# Patient Record
Sex: MALE | Race: WHITE | Employment: OTHER | URBAN - METROPOLITAN AREA
[De-identification: names, ages, dates, MRNs, and addresses within clinical notes are randomized per-mention and may not be internally consistent; named-entity substitution may affect disease eponyms.]

---

## 2018-07-18 PROBLEM — H25.13 NUCLEAR SENILE CATARACT OF BOTH EYES: Status: ACTIVE | Noted: 2018-07-18

## 2019-11-30 ENCOUNTER — HOSPITAL ENCOUNTER (OUTPATIENT)
Age: 69
Discharge: HOME | End: 2019-11-30
Attending: EMERGENCY MEDICINE
Payer: COMMERCIAL

## 2019-11-30 VITALS
HEART RATE: 67 BPM | HEIGHT: 68 IN | RESPIRATION RATE: 18 BRPM | BODY MASS INDEX: 25.46 KG/M2 | WEIGHT: 168 LBS | DIASTOLIC BLOOD PRESSURE: 50 MMHG | OXYGEN SATURATION: 99 % | TEMPERATURE: 98 F | SYSTOLIC BLOOD PRESSURE: 176 MMHG

## 2019-11-30 DIAGNOSIS — K61.1 PERIRECTAL ABSCESS: Primary | ICD-10-CM

## 2019-11-30 PROCEDURE — 99204 OFFICE O/P NEW MOD 45 MIN: CPT

## 2019-11-30 PROCEDURE — 99203 OFFICE O/P NEW LOW 30 MIN: CPT

## 2019-11-30 RX ORDER — CLINDAMYCIN HYDROCHLORIDE 300 MG/1
300 CAPSULE ORAL 3 TIMES DAILY
Qty: 30 CAPSULE | Refills: 0 | Status: SHIPPED | OUTPATIENT
Start: 2019-11-30 | End: 2019-12-10

## 2019-11-30 NOTE — ED PROVIDER NOTES
Patient Seen in: [facility] Immediate Care In [address] [place] [address]      History   Patient presents with:  Rash Skin Problem (integumentary)    Stated Complaint: abcess    HPI    79-year-old male presents for evaluation of pimple.   Patient reports over the pas respiratory distress. Genitourinary:     Comments: Nontender external hemorrhoids noted, single nontender pustule noted at 3 o'clock position approximately 1 cm from anus, no rectal tenderness  Skin:     General: Skin is warm.       Capillary Refill: Capi

## 2019-11-30 NOTE — ED NOTES
Rectal area pimple no surrounding redness or pain with sitting. No fevers. States he felt this pimple  few days ago. There is no drainage or signs of infection seen.

## 2020-03-28 ENCOUNTER — HOSPITAL ENCOUNTER (OUTPATIENT)
Age: 70
Discharge: HOME | End: 2020-03-28
Attending: EMERGENCY MEDICINE
Payer: COMMERCIAL

## 2020-03-28 ENCOUNTER — APPOINTMENT (OUTPATIENT)
Dept: GENERAL RADIOLOGY | Age: 70
End: 2020-03-28
Attending: EMERGENCY MEDICINE
Payer: COMMERCIAL

## 2020-03-28 VITALS
SYSTOLIC BLOOD PRESSURE: 149 MMHG | DIASTOLIC BLOOD PRESSURE: 74 MMHG | OXYGEN SATURATION: 96 % | RESPIRATION RATE: 18 BRPM | TEMPERATURE: 98 F | HEART RATE: 79 BPM

## 2020-03-28 DIAGNOSIS — S63.501A SPRAIN OF RIGHT WRIST, INITIAL ENCOUNTER: Primary | ICD-10-CM

## 2020-03-28 PROCEDURE — 99213 OFFICE O/P EST LOW 20 MIN: CPT

## 2020-03-28 PROCEDURE — 73130 X-RAY EXAM OF HAND: CPT | Performed by: EMERGENCY MEDICINE

## 2020-03-28 PROCEDURE — 73110 X-RAY EXAM OF WRIST: CPT | Performed by: EMERGENCY MEDICINE

## 2020-03-28 NOTE — ED PROVIDER NOTES
Patient Seen in: [facility] Immediate Care In [address] [place] [address]      History   Patient presents with:  Hand Pain    Stated Complaint: needs x-ray of right hand    HPI    The patient is a 71-year-old male with past history of hypertension, \"borderline\" di well-nourished in no acute distress  Head: Normocephalic, no swelling or tenderness  Eyes: Nonicteric sclera, no conjunctival injection  Vascular: Easily palpable right radial pulse with good capillary refill and all digits  Neurologic: Patient is awake, a

## 2020-04-15 ENCOUNTER — HOSPITAL ENCOUNTER (OUTPATIENT)
Age: 70
Discharge: HOME | End: 2020-04-15
Payer: COMMERCIAL

## 2020-04-15 VITALS
WEIGHT: 170 LBS | DIASTOLIC BLOOD PRESSURE: 70 MMHG | OXYGEN SATURATION: 100 % | RESPIRATION RATE: 19 BRPM | BODY MASS INDEX: 24.34 KG/M2 | HEART RATE: 72 BPM | TEMPERATURE: 98 F | HEIGHT: 70 IN | SYSTOLIC BLOOD PRESSURE: 159 MMHG

## 2020-04-15 DIAGNOSIS — N48.1 BALANITIS: Primary | ICD-10-CM

## 2020-04-15 PROCEDURE — 99214 OFFICE O/P EST MOD 30 MIN: CPT

## 2020-04-15 PROCEDURE — 99213 OFFICE O/P EST LOW 20 MIN: CPT

## 2020-04-15 RX ORDER — CLOTRIMAZOLE 1 %
1 CREAM (GRAM) TOPICAL 2 TIMES DAILY
Qty: 1 TUBE | Refills: 0 | Status: SHIPPED | OUTPATIENT
Start: 2020-04-15 | End: 2020-04-29

## 2020-04-15 RX ORDER — CEPHALEXIN 500 MG/1
500 CAPSULE ORAL 3 TIMES DAILY
Qty: 21 CAPSULE | Refills: 0 | Status: SHIPPED | OUTPATIENT
Start: 2020-04-15 | End: 2020-04-22

## 2020-04-15 NOTE — ED INITIAL ASSESSMENT (HPI)
Pt states that he has redness on the tip of his penis that occurred from masturbating. Denies any drainage or breakage of the skin.

## 2020-04-15 NOTE — ED PROVIDER NOTES
Patient Seen in: [facility] Immediate Care In [address] [place] [address]      History   Patient presents with:  Eval-G    Stated Complaint: male problem     HPI    Patient presents to the immediate care center with irritation to the tip of his penis secondary to ma of the penis consistent with balanitis. There is no lesions or drainage.               ED Course   Labs Reviewed - No data to display               MDM     Patient is adamant that he previously has received a prescription for an antibiotic which resolved t

## 2020-04-23 PROBLEM — G89.29 CHRONIC LOW BACK PAIN: Status: ACTIVE | Noted: 2017-01-01

## 2020-04-23 PROBLEM — M54.50 CHRONIC LOW BACK PAIN: Status: ACTIVE | Noted: 2017-01-01

## 2020-04-23 PROBLEM — E78.00 HYPERCHOLESTEREMIA: Status: ACTIVE | Noted: 2020-04-23

## 2020-04-23 PROBLEM — I10 ESSENTIAL HYPERTENSION: Status: ACTIVE | Noted: 2020-04-23

## 2020-04-24 PROBLEM — E11.65 UNCONTROLLED TYPE 2 DIABETES MELLITUS WITH HYPERGLYCEMIA (HCC): Status: ACTIVE | Noted: 2020-04-24

## 2020-04-24 PROBLEM — N18.30 TYPE 2 DM WITH CKD STAGE 3 AND HYPERTENSION (HCC): Status: ACTIVE | Noted: 2020-04-24

## 2020-04-24 PROBLEM — I12.9 TYPE 2 DM WITH CKD STAGE 3 AND HYPERTENSION (HCC): Status: ACTIVE | Noted: 2020-04-24

## 2020-04-24 PROBLEM — E11.22 TYPE 2 DM WITH CKD STAGE 3 AND HYPERTENSION (HCC): Status: ACTIVE | Noted: 2020-04-24

## 2020-06-26 ENCOUNTER — HOSPITAL ENCOUNTER (OUTPATIENT)
Age: 70
Discharge: HOME | End: 2020-06-26
Attending: EMERGENCY MEDICINE
Payer: COMMERCIAL

## 2020-06-26 ENCOUNTER — APPOINTMENT (OUTPATIENT)
Dept: GENERAL RADIOLOGY | Age: 70
End: 2020-06-26
Attending: EMERGENCY MEDICINE
Payer: COMMERCIAL

## 2020-06-26 VITALS
RESPIRATION RATE: 16 BRPM | SYSTOLIC BLOOD PRESSURE: 160 MMHG | HEART RATE: 60 BPM | OXYGEN SATURATION: 97 % | DIASTOLIC BLOOD PRESSURE: 84 MMHG | TEMPERATURE: 98 F

## 2020-06-26 DIAGNOSIS — M54.32 BACK PAIN WITH LEFT-SIDED SCIATICA: ICD-10-CM

## 2020-06-26 DIAGNOSIS — L08.9 INFECTION OF THUMB: Primary | ICD-10-CM

## 2020-06-26 PROCEDURE — 99214 OFFICE O/P EST MOD 30 MIN: CPT

## 2020-06-26 PROCEDURE — 87070 CULTURE OTHR SPECIMN AEROBIC: CPT | Performed by: EMERGENCY MEDICINE

## 2020-06-26 PROCEDURE — 87205 SMEAR GRAM STAIN: CPT | Performed by: EMERGENCY MEDICINE

## 2020-06-26 PROCEDURE — 72100 X-RAY EXAM L-S SPINE 2/3 VWS: CPT | Performed by: EMERGENCY MEDICINE

## 2020-06-26 PROCEDURE — 73140 X-RAY EXAM OF FINGER(S): CPT | Performed by: EMERGENCY MEDICINE

## 2020-06-26 RX ORDER — GABAPENTIN 300 MG/1
300 CAPSULE ORAL 3 TIMES DAILY
Qty: 15 CAPSULE | Refills: 0 | Status: SHIPPED | OUTPATIENT
Start: 2020-06-26 | End: 2021-03-30

## 2020-06-26 RX ORDER — CEPHALEXIN 500 MG/1
500 CAPSULE ORAL 2 TIMES DAILY
Qty: 10 CAPSULE | Refills: 0 | Status: SHIPPED | OUTPATIENT
Start: 2020-06-26 | End: 2020-07-06

## 2020-06-26 RX ORDER — BACLOFEN 20 MG/1
20 TABLET ORAL 3 TIMES DAILY
Qty: 15 TABLET | Refills: 0 | Status: SHIPPED | OUTPATIENT
Start: 2020-06-26 | End: 2020-07-01

## 2020-06-26 NOTE — ED PROVIDER NOTES
Patient Seen in: [facility] Immediate Care In [address] [place] [address]    History   Patient presents with:  Leg Pain    Stated Complaint: back pain and thumb pain    HPI    Chief complaint: Back pain    History of present illness:   The patient complains of back demetri Misc,  Check glucose once daily or more as directed   ACCU-CHEK GUIDE In Vitro Strip,  Check glucose once daily or more as directed   Metoprolol Succinate ER 50 MG Oral Tablet 24 Hr,  Take 1 tablet (50 mg total) by mouth daily.    Diclofenac Sodium 1 % Julio Saini negative.     Neuro: Patient is alert and oriented x3, able to ambulate with steady gait, 5 out of 5 strength bilateral lower extremities hips knees and ankle flexion and extension, dorsiflexion and plantarflexion of the foot preserved bilateral 5 out of 5

## 2020-06-29 ENCOUNTER — APPOINTMENT (OUTPATIENT)
Dept: ULTRASOUND IMAGING | Age: 70
End: 2020-06-29
Attending: EMERGENCY MEDICINE
Payer: COMMERCIAL

## 2020-06-29 ENCOUNTER — HOSPITAL ENCOUNTER (OUTPATIENT)
Age: 70
Discharge: HOME | End: 2020-06-29
Attending: EMERGENCY MEDICINE
Payer: COMMERCIAL

## 2020-06-29 ENCOUNTER — APPOINTMENT (OUTPATIENT)
Dept: GENERAL RADIOLOGY | Age: 70
End: 2020-06-29
Attending: EMERGENCY MEDICINE
Payer: COMMERCIAL

## 2020-06-29 VITALS
TEMPERATURE: 98 F | HEIGHT: 68 IN | BODY MASS INDEX: 25.46 KG/M2 | DIASTOLIC BLOOD PRESSURE: 63 MMHG | SYSTOLIC BLOOD PRESSURE: 159 MMHG | WEIGHT: 168 LBS | HEART RATE: 58 BPM | RESPIRATION RATE: 18 BRPM | OXYGEN SATURATION: 97 %

## 2020-06-29 DIAGNOSIS — S93.402A MILD SPRAIN OF LEFT ANKLE, INITIAL ENCOUNTER: Primary | ICD-10-CM

## 2020-06-29 PROCEDURE — 73610 X-RAY EXAM OF ANKLE: CPT | Performed by: EMERGENCY MEDICINE

## 2020-06-29 PROCEDURE — 99214 OFFICE O/P EST MOD 30 MIN: CPT

## 2020-06-29 PROCEDURE — 93971 EXTREMITY STUDY: CPT | Performed by: EMERGENCY MEDICINE

## 2020-06-29 NOTE — ED PROVIDER NOTES
Patient Seen in: [facility] Immediate Care In [place]    History   Patient presents with: Ankle Injury    Stated Complaint: TL- there yesterday-- xrays back and fingers.  pt complains of left ankle/calf p*    HPI    HPI: Vero Rojas is a 72 y daily.   Diclofenac Sodium 1 % Transdermal Gel,  Apply 2 g topically 3 (three) times daily as needed. losartan 100 MG Oral Tab,  Take 100 mg by mouth daily. Rosuvastatin Calcium 20 MG Oral Tab,  Take 20 mg by mouth nightly.    Nystatin [id number] UNIT/GM Ex deformity. There is no tenderness over the base of the fifth metatarsal. Achilles is palpated and intact functionally. There is no tenderness over the ipsilateral fibular head. Full range of motion of the knee on that side without pain.  No proximal tib fib

## 2020-06-29 NOTE — ED INITIAL ASSESSMENT (HPI)
Left ankle and left calf pain since 6/26/20. Pt was seen here for a fall on 6/26/20. Pt is wearing an ace wrap to the left ankle and calf. Pt is using a cane for pain and support.

## 2020-11-08 ENCOUNTER — HOSPITAL ENCOUNTER (OUTPATIENT)
Age: 70
Discharge: HOME | End: 2020-11-08
Attending: EMERGENCY MEDICINE
Payer: COMMERCIAL

## 2020-11-08 VITALS
WEIGHT: 172 LBS | RESPIRATION RATE: 20 BRPM | TEMPERATURE: 98 F | BODY MASS INDEX: 26.07 KG/M2 | DIASTOLIC BLOOD PRESSURE: 70 MMHG | HEIGHT: 68 IN | SYSTOLIC BLOOD PRESSURE: 151 MMHG | OXYGEN SATURATION: 98 % | HEART RATE: 59 BPM

## 2020-11-08 DIAGNOSIS — Z20.822 ENCOUNTER FOR LABORATORY TESTING FOR COVID-19 VIRUS: Primary | ICD-10-CM

## 2020-11-08 PROCEDURE — 99213 OFFICE O/P EST LOW 20 MIN: CPT | Performed by: EMERGENCY MEDICINE

## 2020-11-08 NOTE — ED PROVIDER NOTES
Patient Seen in: [facility] [place]      History   Patient presents with:  Testing  Fatigue    Stated Complaint: covid test    HPI    Patient is a 64-year-old male who works as a .   He presents today for Covid testing though he is asymptoma Current:/70   Pulse 59   Temp 97.9 °F (36.6 °C) (Temporal)   Resp 20   Ht 172.7 cm (5' 8\")   Wt 78 kg   SpO2 98%   BMI 26.15 kg/m²         Physical Exam  Vitals signs reviewed.    HENT:      Right Ear: Tympanic membrane normal.      Left Ear: T

## 2020-12-02 PROBLEM — E11.65 UNCONTROLLED TYPE 2 DIABETES MELLITUS WITH HYPERGLYCEMIA (HCC): Status: RESOLVED | Noted: 2020-04-24 | Resolved: 2020-12-02

## 2021-01-06 PROBLEM — M67.442 GANGLION CYST OF BOTH HANDS: Status: ACTIVE | Noted: 2021-01-06

## 2021-01-06 PROBLEM — M67.441 GANGLION CYST OF BOTH HANDS: Status: ACTIVE | Noted: 2021-01-06

## 2021-03-25 PROBLEM — L24.3 IRRITANT CONTACT DERMATITIS DUE TO COSMETICS: Status: ACTIVE | Noted: 2021-03-25

## 2021-03-30 PROBLEM — M79.674 PAIN OF TOE OF RIGHT FOOT: Status: ACTIVE | Noted: 2021-03-30

## 2021-07-19 PROBLEM — M79.674 PAIN OF TOE OF RIGHT FOOT: Status: RESOLVED | Noted: 2021-03-30 | Resolved: 2021-07-19

## 2021-07-19 PROBLEM — L24.3 IRRITANT CONTACT DERMATITIS DUE TO COSMETICS: Status: RESOLVED | Noted: 2021-03-25 | Resolved: 2021-07-19

## 2022-03-07 PROBLEM — N18.30 TYPE 2 DM WITH CKD STAGE 3 AND HYPERTENSION (HCC): Status: RESOLVED | Noted: 2020-04-24 | Resolved: 2022-03-07

## 2022-03-07 PROBLEM — R73.9 HYPERGLYCEMIA: Status: ACTIVE | Noted: 2022-03-07

## 2022-03-07 PROBLEM — E11.22 TYPE 2 DM WITH CKD STAGE 3 AND HYPERTENSION (HCC): Status: RESOLVED | Noted: 2020-04-24 | Resolved: 2022-03-07

## 2022-03-07 PROBLEM — I12.9 TYPE 2 DM WITH CKD STAGE 3 AND HYPERTENSION (HCC): Status: RESOLVED | Noted: 2020-04-24 | Resolved: 2022-03-07

## 2022-05-20 ENCOUNTER — HOSPITAL ENCOUNTER (OUTPATIENT)
Age: 72
Discharge: HOME | End: 2022-05-20
Payer: COMMERCIAL

## 2022-05-20 VITALS
DIASTOLIC BLOOD PRESSURE: 67 MMHG | WEIGHT: 168 LBS | HEIGHT: 68 IN | OXYGEN SATURATION: 98 % | TEMPERATURE: 97 F | BODY MASS INDEX: 25.46 KG/M2 | HEART RATE: 66 BPM | SYSTOLIC BLOOD PRESSURE: 155 MMHG | RESPIRATION RATE: 18 BRPM

## 2022-05-20 DIAGNOSIS — K61.2 ABSCESS OF ANAL AND RECTAL REGIONS: Primary | ICD-10-CM

## 2022-05-28 ENCOUNTER — HOSPITAL ENCOUNTER (OUTPATIENT)
Age: 72
Discharge: HOME | End: 2022-05-28
Payer: COMMERCIAL

## 2022-05-28 VITALS
HEIGHT: 68 IN | WEIGHT: 172 LBS | RESPIRATION RATE: 18 BRPM | SYSTOLIC BLOOD PRESSURE: 181 MMHG | BODY MASS INDEX: 26.07 KG/M2 | OXYGEN SATURATION: 97 % | HEART RATE: 67 BPM | TEMPERATURE: 97 F | DIASTOLIC BLOOD PRESSURE: 90 MMHG

## 2022-05-28 DIAGNOSIS — L02.31 ABSCESS OF BUTTOCK, RIGHT: Primary | ICD-10-CM

## 2022-05-28 NOTE — ED INITIAL ASSESSMENT (HPI)
Pt here for c/o abscess to rectal area x 1-2 wk. No fever. States was seen here for same a few days ago and had it drained but abscess still there. States taking antibiotics.

## 2023-09-23 ENCOUNTER — APPOINTMENT (OUTPATIENT)
Dept: GENERAL RADIOLOGY | Age: 73
End: 2023-09-23
Attending: PHYSICIAN ASSISTANT
Payer: COMMERCIAL

## 2023-09-23 ENCOUNTER — HOSPITAL ENCOUNTER (OUTPATIENT)
Age: 73
Discharge: HOME | End: 2023-09-23
Payer: COMMERCIAL

## 2023-09-23 VITALS
OXYGEN SATURATION: 98 % | HEART RATE: 92 BPM | RESPIRATION RATE: 20 BRPM | SYSTOLIC BLOOD PRESSURE: 188 MMHG | DIASTOLIC BLOOD PRESSURE: 58 MMHG | TEMPERATURE: 98 F

## 2023-09-23 DIAGNOSIS — S33.5XXA LUMBAR SPRAIN, INITIAL ENCOUNTER: Primary | ICD-10-CM

## 2023-09-23 DIAGNOSIS — R03.0 ELEVATED BLOOD PRESSURE READING: ICD-10-CM

## 2023-09-23 PROCEDURE — 72100 X-RAY EXAM L-S SPINE 2/3 VWS: CPT | Performed by: PHYSICIAN ASSISTANT

## 2023-09-23 PROCEDURE — 99213 OFFICE O/P EST LOW 20 MIN: CPT | Performed by: PHYSICIAN ASSISTANT

## 2023-09-23 RX ORDER — IBUPROFEN 600 MG/1
600 TABLET ORAL ONCE
Status: COMPLETED | OUTPATIENT
Start: 2023-09-23 | End: 2023-09-23

## 2023-09-23 NOTE — ED INITIAL ASSESSMENT (HPI)
Pt states he fell about two hours PTA with c/o back pain, slipping on wet floor. Denies hitting head or LOC. Pt very irritable and confrontational with RN.

## 2023-11-14 RX ORDER — RIBOFLAVIN (VITAMIN B2) 100 MG
100 TABLET ORAL DAILY
COMMUNITY

## 2023-11-14 RX ORDER — BUPROPION HYDROCHLORIDE 150 MG/1
150 TABLET, EXTENDED RELEASE ORAL 2 TIMES DAILY
COMMUNITY

## 2023-11-15 RX ORDER — TURMERIC 400 MG
CAPSULE ORAL
COMMUNITY
End: 2023-12-13

## 2023-11-15 RX ORDER — AMPICILLIN TRIHYDRATE 250 MG
500 CAPSULE ORAL DAILY
COMMUNITY
End: 2023-12-13

## 2023-12-11 ENCOUNTER — ANESTHESIA (OUTPATIENT)
Dept: SURGERY | Facility: HOSPITAL | Age: 73
End: 2023-12-11
Payer: COMMERCIAL

## 2023-12-11 ENCOUNTER — ANESTHESIA EVENT (OUTPATIENT)
Dept: SURGERY | Facility: HOSPITAL | Age: 73
End: 2023-12-11
Payer: COMMERCIAL

## 2023-12-11 ENCOUNTER — HOSPITAL ENCOUNTER (OUTPATIENT)
Facility: HOSPITAL | Age: 73
Discharge: HOME | End: 2023-12-13
Attending: OTOLARYNGOLOGY | Admitting: OTOLARYNGOLOGY
Payer: COMMERCIAL

## 2023-12-11 DIAGNOSIS — D49.0 PAROTID TUMOR: Primary | ICD-10-CM

## 2023-12-11 LAB
GLUCOSE BLD-MCNC: 123 MG/DL (ref 70–99)
GLUCOSE BLD-MCNC: 125 MG/DL (ref 70–99)

## 2023-12-11 PROCEDURE — 4A11X4G MONITORING OF PERIPHERAL NERVOUS ELECTRICAL ACTIVITY, INTRAOPERATIVE, EXTERNAL APPROACH: ICD-10-PCS | Performed by: OTOLARYNGOLOGY

## 2023-12-11 PROCEDURE — 82962 GLUCOSE BLOOD TEST: CPT

## 2023-12-11 PROCEDURE — 88307 TISSUE EXAM BY PATHOLOGIST: CPT | Performed by: OTOLARYNGOLOGY

## 2023-12-11 PROCEDURE — 0CT80ZZ RESECTION OF RIGHT PAROTID GLAND, OPEN APPROACH: ICD-10-PCS | Performed by: OTOLARYNGOLOGY

## 2023-12-11 RX ORDER — HYDROMORPHONE HYDROCHLORIDE 1 MG/ML
0.6 INJECTION, SOLUTION INTRAMUSCULAR; INTRAVENOUS; SUBCUTANEOUS EVERY 5 MIN PRN
Status: DISCONTINUED | OUTPATIENT
Start: 2023-12-11 | End: 2023-12-11 | Stop reason: HOSPADM

## 2023-12-11 RX ORDER — MIDAZOLAM HYDROCHLORIDE 1 MG/ML
1 INJECTION INTRAMUSCULAR; INTRAVENOUS EVERY 10 MIN PRN
Status: DISCONTINUED | OUTPATIENT
Start: 2023-12-11 | End: 2023-12-11 | Stop reason: HOSPADM

## 2023-12-11 RX ORDER — HYDROCODONE BITARTRATE AND ACETAMINOPHEN 5; 325 MG/1; MG/1
1 TABLET ORAL EVERY 6 HOURS PRN
Status: DISCONTINUED | OUTPATIENT
Start: 2023-12-11 | End: 2023-12-13

## 2023-12-11 RX ORDER — ROCURONIUM BROMIDE 10 MG/ML
INJECTION, SOLUTION INTRAVENOUS AS NEEDED
Status: DISCONTINUED | OUTPATIENT
Start: 2023-12-11 | End: 2023-12-11 | Stop reason: SURG

## 2023-12-11 RX ORDER — ONDANSETRON 2 MG/ML
4 INJECTION INTRAMUSCULAR; INTRAVENOUS EVERY 6 HOURS PRN
Status: DISCONTINUED | OUTPATIENT
Start: 2023-12-11 | End: 2023-12-11 | Stop reason: HOSPADM

## 2023-12-11 RX ORDER — HYDROCODONE BITARTRATE AND ACETAMINOPHEN 5; 325 MG/1; MG/1
2 TABLET ORAL EVERY 6 HOURS PRN
Status: DISCONTINUED | OUTPATIENT
Start: 2023-12-11 | End: 2023-12-13

## 2023-12-11 RX ORDER — MEPERIDINE HYDROCHLORIDE 25 MG/ML
12.5 INJECTION INTRAMUSCULAR; INTRAVENOUS; SUBCUTANEOUS AS NEEDED
Status: DISCONTINUED | OUTPATIENT
Start: 2023-12-11 | End: 2023-12-11 | Stop reason: HOSPADM

## 2023-12-11 RX ORDER — MIDAZOLAM HYDROCHLORIDE 1 MG/ML
INJECTION INTRAMUSCULAR; INTRAVENOUS AS NEEDED
Status: DISCONTINUED | OUTPATIENT
Start: 2023-12-11 | End: 2023-12-11 | Stop reason: SURG

## 2023-12-11 RX ORDER — NALOXONE HYDROCHLORIDE 0.4 MG/ML
80 INJECTION, SOLUTION INTRAMUSCULAR; INTRAVENOUS; SUBCUTANEOUS AS NEEDED
Status: DISCONTINUED | OUTPATIENT
Start: 2023-12-11 | End: 2023-12-11 | Stop reason: HOSPADM

## 2023-12-11 RX ORDER — AMLODIPINE BESYLATE 5 MG/1
5 TABLET ORAL DAILY
Status: DISCONTINUED | OUTPATIENT
Start: 2023-12-11 | End: 2023-12-13

## 2023-12-11 RX ORDER — SODIUM CHLORIDE, SODIUM LACTATE, POTASSIUM CHLORIDE, CALCIUM CHLORIDE 600; 310; 30; 20 MG/100ML; MG/100ML; MG/100ML; MG/100ML
INJECTION, SOLUTION INTRAVENOUS CONTINUOUS
Status: DISCONTINUED | OUTPATIENT
Start: 2023-12-11 | End: 2023-12-13

## 2023-12-11 RX ORDER — ACETAMINOPHEN 500 MG
1000 TABLET ORAL ONCE AS NEEDED
Status: DISCONTINUED | OUTPATIENT
Start: 2023-12-11 | End: 2023-12-11 | Stop reason: HOSPADM

## 2023-12-11 RX ORDER — GLYCOPYRROLATE 0.2 MG/ML
INJECTION, SOLUTION INTRAMUSCULAR; INTRAVENOUS AS NEEDED
Status: DISCONTINUED | OUTPATIENT
Start: 2023-12-11 | End: 2023-12-11 | Stop reason: SURG

## 2023-12-11 RX ORDER — NICOTINE POLACRILEX 4 MG
15 LOZENGE BUCCAL
Status: DISCONTINUED | OUTPATIENT
Start: 2023-12-11 | End: 2023-12-11 | Stop reason: HOSPADM

## 2023-12-11 RX ORDER — LIDOCAINE HYDROCHLORIDE AND EPINEPHRINE 10; 10 MG/ML; UG/ML
INJECTION, SOLUTION INFILTRATION; PERINEURAL AS NEEDED
Status: DISCONTINUED | OUTPATIENT
Start: 2023-12-11 | End: 2023-12-11 | Stop reason: HOSPADM

## 2023-12-11 RX ORDER — MORPHINE SULFATE 2 MG/ML
0.5 INJECTION, SOLUTION INTRAMUSCULAR; INTRAVENOUS EVERY 2 HOUR PRN
Status: DISCONTINUED | OUTPATIENT
Start: 2023-12-11 | End: 2023-12-13

## 2023-12-11 RX ORDER — CEFAZOLIN SODIUM/WATER 2 G/20 ML
2 SYRINGE (ML) INTRAVENOUS ONCE
Status: DISCONTINUED | OUTPATIENT
Start: 2023-12-11 | End: 2023-12-11

## 2023-12-11 RX ORDER — DEXTROSE MONOHYDRATE 25 G/50ML
50 INJECTION, SOLUTION INTRAVENOUS
Status: DISCONTINUED | OUTPATIENT
Start: 2023-12-11 | End: 2023-12-11 | Stop reason: HOSPADM

## 2023-12-11 RX ORDER — NICOTINE POLACRILEX 4 MG
30 LOZENGE BUCCAL
Status: DISCONTINUED | OUTPATIENT
Start: 2023-12-11 | End: 2023-12-11 | Stop reason: HOSPADM

## 2023-12-11 RX ORDER — MORPHINE SULFATE 2 MG/ML
2 INJECTION, SOLUTION INTRAMUSCULAR; INTRAVENOUS EVERY 2 HOUR PRN
Status: DISCONTINUED | OUTPATIENT
Start: 2023-12-11 | End: 2023-12-13

## 2023-12-11 RX ORDER — ACETAMINOPHEN 325 MG/1
650 TABLET ORAL EVERY 4 HOURS PRN
Status: DISCONTINUED | OUTPATIENT
Start: 2023-12-11 | End: 2023-12-13

## 2023-12-11 RX ORDER — LIDOCAINE HYDROCHLORIDE 40 MG/ML
SOLUTION TOPICAL AS NEEDED
Status: DISCONTINUED | OUTPATIENT
Start: 2023-12-11 | End: 2023-12-11 | Stop reason: SURG

## 2023-12-11 RX ORDER — ROSUVASTATIN CALCIUM 20 MG/1
20 TABLET, COATED ORAL DAILY
Status: DISCONTINUED | OUTPATIENT
Start: 2023-12-11 | End: 2023-12-13

## 2023-12-11 RX ORDER — HYDROMORPHONE HYDROCHLORIDE 1 MG/ML
0.4 INJECTION, SOLUTION INTRAMUSCULAR; INTRAVENOUS; SUBCUTANEOUS EVERY 5 MIN PRN
Status: DISCONTINUED | OUTPATIENT
Start: 2023-12-11 | End: 2023-12-11 | Stop reason: HOSPADM

## 2023-12-11 RX ORDER — SODIUM CHLORIDE AND POTASSIUM CHLORIDE 150; 900 MG/100ML; MG/100ML
INJECTION, SOLUTION INTRAVENOUS CONTINUOUS
Status: DISCONTINUED | OUTPATIENT
Start: 2023-12-11 | End: 2023-12-13

## 2023-12-11 RX ORDER — AMOXICILLIN AND CLAVULANATE POTASSIUM 875; 125 MG/1; MG/1
875 TABLET, FILM COATED ORAL EVERY 12 HOURS SCHEDULED
Status: DISCONTINUED | OUTPATIENT
Start: 2023-12-11 | End: 2023-12-13

## 2023-12-11 RX ORDER — HYDROMORPHONE HYDROCHLORIDE 1 MG/ML
0.2 INJECTION, SOLUTION INTRAMUSCULAR; INTRAVENOUS; SUBCUTANEOUS EVERY 5 MIN PRN
Status: DISCONTINUED | OUTPATIENT
Start: 2023-12-11 | End: 2023-12-11 | Stop reason: HOSPADM

## 2023-12-11 RX ORDER — ONDANSETRON 2 MG/ML
INJECTION INTRAMUSCULAR; INTRAVENOUS AS NEEDED
Status: DISCONTINUED | OUTPATIENT
Start: 2023-12-11 | End: 2023-12-11 | Stop reason: SURG

## 2023-12-11 RX ORDER — LABETALOL HYDROCHLORIDE 5 MG/ML
5 INJECTION, SOLUTION INTRAVENOUS EVERY 5 MIN PRN
Status: DISCONTINUED | OUTPATIENT
Start: 2023-12-11 | End: 2023-12-11 | Stop reason: HOSPADM

## 2023-12-11 RX ORDER — HYDROCODONE BITARTRATE AND ACETAMINOPHEN 5; 325 MG/1; MG/1
2 TABLET ORAL ONCE AS NEEDED
Status: DISCONTINUED | OUTPATIENT
Start: 2023-12-11 | End: 2023-12-11 | Stop reason: HOSPADM

## 2023-12-11 RX ORDER — LIDOCAINE HYDROCHLORIDE 10 MG/ML
INJECTION, SOLUTION EPIDURAL; INFILTRATION; INTRACAUDAL; PERINEURAL AS NEEDED
Status: DISCONTINUED | OUTPATIENT
Start: 2023-12-11 | End: 2023-12-11 | Stop reason: SURG

## 2023-12-11 RX ORDER — DIPHENHYDRAMINE HYDROCHLORIDE 50 MG/ML
12.5 INJECTION INTRAMUSCULAR; INTRAVENOUS AS NEEDED
Status: DISCONTINUED | OUTPATIENT
Start: 2023-12-11 | End: 2023-12-11 | Stop reason: HOSPADM

## 2023-12-11 RX ORDER — MORPHINE SULFATE 2 MG/ML
1 INJECTION, SOLUTION INTRAMUSCULAR; INTRAVENOUS EVERY 2 HOUR PRN
Status: DISCONTINUED | OUTPATIENT
Start: 2023-12-11 | End: 2023-12-13

## 2023-12-11 RX ORDER — METOCLOPRAMIDE HYDROCHLORIDE 5 MG/ML
10 INJECTION INTRAMUSCULAR; INTRAVENOUS EVERY 8 HOURS PRN
Status: DISCONTINUED | OUTPATIENT
Start: 2023-12-11 | End: 2023-12-11 | Stop reason: HOSPADM

## 2023-12-11 RX ORDER — METOCLOPRAMIDE HYDROCHLORIDE 5 MG/ML
INJECTION INTRAMUSCULAR; INTRAVENOUS AS NEEDED
Status: DISCONTINUED | OUTPATIENT
Start: 2023-12-11 | End: 2023-12-11 | Stop reason: SURG

## 2023-12-11 RX ORDER — BISACODYL 10 MG
10 SUPPOSITORY, RECTAL RECTAL
Status: DISCONTINUED | OUTPATIENT
Start: 2023-12-11 | End: 2023-12-13

## 2023-12-11 RX ORDER — ONDANSETRON 2 MG/ML
4 INJECTION INTRAMUSCULAR; INTRAVENOUS EVERY 6 HOURS PRN
Status: DISCONTINUED | OUTPATIENT
Start: 2023-12-11 | End: 2023-12-13

## 2023-12-11 RX ORDER — BUPROPION HYDROCHLORIDE 150 MG/1
150 TABLET, EXTENDED RELEASE ORAL 2 TIMES DAILY
Status: DISCONTINUED | OUTPATIENT
Start: 2023-12-11 | End: 2023-12-13

## 2023-12-11 RX ORDER — HYDROCODONE BITARTRATE AND ACETAMINOPHEN 5; 325 MG/1; MG/1
1 TABLET ORAL ONCE AS NEEDED
Status: DISCONTINUED | OUTPATIENT
Start: 2023-12-11 | End: 2023-12-11 | Stop reason: HOSPADM

## 2023-12-11 RX ORDER — ACETAMINOPHEN 500 MG
1000 TABLET ORAL ONCE
Status: DISCONTINUED | OUTPATIENT
Start: 2023-12-11 | End: 2023-12-11 | Stop reason: HOSPADM

## 2023-12-11 RX ORDER — CEFAZOLIN SODIUM 1 G/3ML
INJECTION, POWDER, FOR SOLUTION INTRAMUSCULAR; INTRAVENOUS AS NEEDED
Status: DISCONTINUED | OUTPATIENT
Start: 2023-12-11 | End: 2023-12-11 | Stop reason: SURG

## 2023-12-11 RX ORDER — SODIUM CHLORIDE, SODIUM LACTATE, POTASSIUM CHLORIDE, CALCIUM CHLORIDE 600; 310; 30; 20 MG/100ML; MG/100ML; MG/100ML; MG/100ML
INJECTION, SOLUTION INTRAVENOUS CONTINUOUS
Status: DISCONTINUED | OUTPATIENT
Start: 2023-12-11 | End: 2023-12-11 | Stop reason: HOSPADM

## 2023-12-11 RX ORDER — POLYETHYLENE GLYCOL 3350 17 G/17G
17 POWDER, FOR SOLUTION ORAL DAILY PRN
Status: DISCONTINUED | OUTPATIENT
Start: 2023-12-11 | End: 2023-12-13

## 2023-12-11 RX ORDER — SENNOSIDES 8.6 MG
17.2 TABLET ORAL NIGHTLY PRN
Status: DISCONTINUED | OUTPATIENT
Start: 2023-12-11 | End: 2023-12-13

## 2023-12-11 RX ORDER — PHENYLEPHRINE HCL 10 MG/ML
VIAL (ML) INJECTION AS NEEDED
Status: DISCONTINUED | OUTPATIENT
Start: 2023-12-11 | End: 2023-12-11 | Stop reason: SURG

## 2023-12-11 RX ORDER — METOPROLOL SUCCINATE 25 MG/1
25 TABLET, EXTENDED RELEASE ORAL 2 TIMES DAILY
Status: DISCONTINUED | OUTPATIENT
Start: 2023-12-11 | End: 2023-12-13

## 2023-12-11 RX ADMIN — PHENYLEPHRINE HCL 50 MCG: 10 MG/ML VIAL (ML) INJECTION at 18:41:00

## 2023-12-11 RX ADMIN — PHENYLEPHRINE HCL 50 MCG: 10 MG/ML VIAL (ML) INJECTION at 19:35:00

## 2023-12-11 RX ADMIN — ONDANSETRON 4 MG: 2 INJECTION INTRAMUSCULAR; INTRAVENOUS at 19:23:00

## 2023-12-11 RX ADMIN — GLYCOPYRROLATE 0.3 MG: 0.2 INJECTION, SOLUTION INTRAMUSCULAR; INTRAVENOUS at 16:46:00

## 2023-12-11 RX ADMIN — CEFAZOLIN SODIUM 2 G: 1 INJECTION, POWDER, FOR SOLUTION INTRAMUSCULAR; INTRAVENOUS at 16:21:00

## 2023-12-11 RX ADMIN — SODIUM CHLORIDE, SODIUM LACTATE, POTASSIUM CHLORIDE, CALCIUM CHLORIDE: 600; 310; 30; 20 INJECTION, SOLUTION INTRAVENOUS at 16:12:00

## 2023-12-11 RX ADMIN — METOCLOPRAMIDE HYDROCHLORIDE 10 MG: 5 INJECTION INTRAMUSCULAR; INTRAVENOUS at 16:12:00

## 2023-12-11 RX ADMIN — LIDOCAINE HYDROCHLORIDE 2 ML: 40 SOLUTION TOPICAL at 16:17:00

## 2023-12-11 RX ADMIN — MIDAZOLAM HYDROCHLORIDE 2 MG: 1 INJECTION INTRAMUSCULAR; INTRAVENOUS at 16:12:00

## 2023-12-11 RX ADMIN — ROCURONIUM BROMIDE 10 MG: 10 INJECTION, SOLUTION INTRAVENOUS at 16:17:00

## 2023-12-11 RX ADMIN — PHENYLEPHRINE HCL 50 MCG: 10 MG/ML VIAL (ML) INJECTION at 16:41:00

## 2023-12-11 RX ADMIN — SODIUM CHLORIDE, SODIUM LACTATE, POTASSIUM CHLORIDE, CALCIUM CHLORIDE: 600; 310; 30; 20 INJECTION, SOLUTION INTRAVENOUS at 17:22:00

## 2023-12-11 RX ADMIN — LIDOCAINE HYDROCHLORIDE 100 MG: 10 INJECTION, SOLUTION EPIDURAL; INFILTRATION; INTRACAUDAL; PERINEURAL at 16:17:00

## 2023-12-11 RX ADMIN — SODIUM CHLORIDE, SODIUM LACTATE, POTASSIUM CHLORIDE, CALCIUM CHLORIDE: 600; 310; 30; 20 INJECTION, SOLUTION INTRAVENOUS at 18:23:00

## 2023-12-11 NOTE — INTERVAL H&P NOTE
Pre-op Diagnosis: PAROTID TUMOR    The above referenced H&P was reviewed by Ayla Mcdonald MD on 12/11/2023, the patient was examined and no significant changes have occurred in the patient's condition since the H&P was performed. I discussed with the patient and/or legal representative the potential benefits, risks and side effects of this procedure; the likelihood of the patient achieving goals; and potential problems that might occur during recuperation. I discussed reasonable alternatives to the procedure, including risks, benefits and side effects related to the alternatives and risks related to not receiving this procedure. We will proceed with procedure as planned.

## 2023-12-11 NOTE — ANESTHESIA PROCEDURE NOTES
Airway  Date/Time: 12/11/2023 4:17 PM  Urgency: elective    Airway not difficult    General Information and Staff    Patient location during procedure: OR  Anesthesiologist: Archana Lucas MD  Performed: anesthesiologist   Performed by: Archana Lucas MD  Authorized by: Archana Lucas MD      Indications and Patient Condition  Indications for airway management: anesthesia  Spontaneous Ventilation: absent  Sedation level: deep  Preoxygenated: yes  Patient position: sniffing  Mask difficulty assessment: 0 - not attempted    Final Airway Details  Final airway type: endotracheal airway      Successful airway: ETT  Cuffed: yes   Successful intubation technique: Video laryngoscopy  Facilitating devices/methods: intubating stylet  Endotracheal tube insertion site: oral  Blade size: #4  ETT size (mm): 7.5    Cormack-Lehane Classification: grade I - full view of glottis  Placement verified by: capnometry   Cuff volume (mL): 11  Measured from: lips  ETT to lips (cm): 23  Number of attempts at approach: 1  Number of other approaches attempted: 0

## 2023-12-12 LAB
BASOPHILS # BLD AUTO: 0.05 X10(3) UL (ref 0–0.2)
BASOPHILS NFR BLD AUTO: 0.5 %
EOSINOPHIL # BLD AUTO: 0.27 X10(3) UL (ref 0–0.7)
EOSINOPHIL NFR BLD AUTO: 2.9 %
ERYTHROCYTE [DISTWIDTH] IN BLOOD BY AUTOMATED COUNT: 12.6 %
EST. AVERAGE GLUCOSE BLD GHB EST-MCNC: 157 MG/DL (ref 68–126)
GLUCOSE BLD-MCNC: 114 MG/DL (ref 70–99)
GLUCOSE BLD-MCNC: 159 MG/DL (ref 70–99)
GLUCOSE BLD-MCNC: 163 MG/DL (ref 70–99)
GLUCOSE BLD-MCNC: 167 MG/DL (ref 70–99)
GLUCOSE BLD-MCNC: 200 MG/DL (ref 70–99)
HBA1C MFR BLD: 7.1 % (ref ?–5.7)
HCT VFR BLD AUTO: 40.7 %
HGB BLD-MCNC: 13.9 G/DL
IMM GRANULOCYTES # BLD AUTO: 0.05 X10(3) UL (ref 0–1)
IMM GRANULOCYTES NFR BLD: 0.5 %
LYMPHOCYTES # BLD AUTO: 1.12 X10(3) UL (ref 1–4)
LYMPHOCYTES NFR BLD AUTO: 11.8 %
MCH RBC QN AUTO: 32.3 PG (ref 26–34)
MCHC RBC AUTO-ENTMCNC: 34.2 G/DL (ref 31–37)
MCV RBC AUTO: 94.4 FL
MONOCYTES # BLD AUTO: 0.78 X10(3) UL (ref 0.1–1)
MONOCYTES NFR BLD AUTO: 8.2 %
NEUTROPHILS # BLD AUTO: 7.19 X10 (3) UL (ref 1.5–7.7)
NEUTROPHILS # BLD AUTO: 7.19 X10(3) UL (ref 1.5–7.7)
NEUTROPHILS NFR BLD AUTO: 76.1 %
PLATELET # BLD AUTO: 171 10(3)UL (ref 150–450)
RBC # BLD AUTO: 4.31 X10(6)UL
WBC # BLD AUTO: 9.5 X10(3) UL (ref 4–11)

## 2023-12-12 PROCEDURE — 85025 COMPLETE CBC W/AUTO DIFF WBC: CPT | Performed by: OTOLARYNGOLOGY

## 2023-12-12 PROCEDURE — 82962 GLUCOSE BLOOD TEST: CPT

## 2023-12-12 PROCEDURE — 83036 HEMOGLOBIN GLYCOSYLATED A1C: CPT | Performed by: INTERNAL MEDICINE

## 2023-12-12 RX ORDER — NICOTINE POLACRILEX 4 MG
15 LOZENGE BUCCAL
Status: DISCONTINUED | OUTPATIENT
Start: 2023-12-12 | End: 2023-12-13

## 2023-12-12 RX ORDER — HYDRALAZINE HYDROCHLORIDE 20 MG/ML
10 INJECTION INTRAMUSCULAR; INTRAVENOUS EVERY 6 HOURS PRN
Status: DISCONTINUED | OUTPATIENT
Start: 2023-12-12 | End: 2023-12-13

## 2023-12-12 RX ORDER — NICOTINE POLACRILEX 4 MG
30 LOZENGE BUCCAL
Status: DISCONTINUED | OUTPATIENT
Start: 2023-12-12 | End: 2023-12-13

## 2023-12-12 RX ORDER — DEXTROSE MONOHYDRATE 25 G/50ML
50 INJECTION, SOLUTION INTRAVENOUS
Status: DISCONTINUED | OUTPATIENT
Start: 2023-12-12 | End: 2023-12-13

## 2023-12-12 RX ORDER — BENZONATATE 100 MG/1
100 CAPSULE ORAL 3 TIMES DAILY PRN
Status: DISCONTINUED | OUTPATIENT
Start: 2023-12-12 | End: 2023-12-13

## 2023-12-12 NOTE — PLAN OF CARE
Patient admitted via bed from PACU  Oriented to room. Safety precautions initiated. Bed in low position. Call light in reach.

## 2023-12-12 NOTE — OPERATIVE REPORT
PATIENT NAME: Ronit Peterson   YOB: 1950  MRN: [id number]   DATE OF OPERATION: 12/11/2023   LOCATION OF OPERATION: [facility]     PREOPERATIVE DIAGNOSIS:    1. Right parotid tumor    POSTOPERATIVE DIAGNOSIS:   1. Same     PROCEDURE:    1. Right total parotidectomy with dissection and preservation of the facial nerve    ANESTHESIA: General endotracheal anesthesia  SURGEON: Jamie Abdi MD, FACS  CO-SURGEON: Litzy Upton MD  EBL: 10 mL  IVF: 1400 mL  UOP: None  DRAINS: 10 [place]an round drain to the right neck  IMPLANTS: None  SPECIMENS: Right total parotid gland with tumor  FINDINGS: 5 x 5 cm right parotid tumor extending significantly into the deep lobe    INDICATIONS & HISTORY:   Ronit Peterson is a 68year old male we are with a history of an enlarging right parotid mass. CT scan showed a multilobulated 4.6 cm mass of the right parotid gland involving both superficial and deep lobes. 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 After a discussion of risks, benefits, alternatives to parotidectomy he wished to proceed with surgery. DESCRIPTION OF PROCEDURE:   The patient was brought to the operating room, correctly identified, and the procedures verified as part of a full time out. Following the induction of general anesthesia, he was endotracheally intubated without difficulty. The operating table was turned 90-degrees to the patient's right. No long-acting paralytic agents were used. A shoulder roll was placed to slightly extend the neck and head was turned to the left. The nerve integrity monitor leads were replaced in the right orbicularis oculi and right orbicularis oris. The nerve monitor was connected, tested and was found to be working properly. The area of the proposed incision on the right was then cleansed with alcohol swabs prior to infiltration with 1% lidocaine with 1 100,000 epinephrine.   He was prepped and draped in standard fashion for parotidectomy. A 15 blade scalpel was used to make a modified Felice incision and the electrocautery was used to deepen the incision. Flaps were elevated in the usual fashion with electrocautery. The sternocleidomastoid muscle was exposed posteriorly. Pegol pointer was dissected. The external jugular vein and greater auricular nerve were divided with the harmonic cristóbal. The gland and tumor were then dissected free from the anterior border of the sternocleidomastoid muscle. The intervening bridge of tissue between this inferior portion and the tragal pointer was then carefully taken down with harmonic cristóbal. Next, the posterior belly of the digastric muscle was identified and was traced to the mastoid. The facial nerve trunk was then identified in its usual position and was verified with a nerve integrity monitor probe. The nerve was traced distally to the pes anserinus. Middle and inferior divisions were then dissected and the tumor was found to be deep and inferior to the middle and inferior divisions therefore no further dissection on the superior division was undertaken. The middle linear inferior divisions were carefully traced distally bisecting overlying parotid tissue with the harmonic cristóbal keeping the nerve in view at all times. These nerves well exposed the tumor was noted to be extending deep to the nerve trunk and middle and inferior divisions. These portions of the facial nerve were meticulously dissected from the capsule of the tumor which was then delivered posteriorly. At the conclusion of the case all branches of the facial nerve were visibly intact and stimulated well with the nerve integrity monitor probe. The wound bed was then irrigated with sterile saline. Hemostasis was confirmed. A small amount of Surgicel was placed over the nerve trunk and the pes anserinus.   A 10 Indonesian round drain was then placed in the depths of the wound and brought out through a separate stab incision inferior to the main incision. Drain was secured with a 2-0 nylon suture. The incision was then closed in a multilayered fashion with 3-0 and 4-0 Vicryl for the deep layers and 4-0 Prolene and 4-0 fast gut for the skin. Bacitracin ointment was applied prior to placement of a sterile occlusive dressing. The patient was allowed to emerge from anesthesia , was extubated and was transported in stable condition to the recovery room.   Recovery room facial movement was noted to be symmetrical.    COMPLICATIONS: None   DISPOSITION: Stable to recovery

## 2023-12-12 NOTE — PLAN OF CARE
A&Ox4. VSS. RA. . Denies chest pain and SOB. GI: Abdomen soft, nondistended. Passing gas. Denies nausea. : Voids in urinal in bathroom. Pain controlled with PRN pain medications. Norco given. Up with standby assist.   Drains: R neck OH drain to bulb suction with serosang output is CDI. Incisions: R neck incision is covered with telfa and tegaderm, no drainage noted. Diet: Tolerating clears, ADAT. IVF running per order. All appropriate safety measures in place. All questions and concerns addressed.

## 2023-12-12 NOTE — BRIEF OP NOTE
Pre-Operative Diagnosis: PAROTID TUMOR     Post-Operative Diagnosis: PAROTID TUMOR      Procedure Performed:   Right total parotidectomy w dissection and preservation of facial nerve    Surgeon(s) and Role:     Justa Sandoval MD - Primary     * Pamela Pacheco MD - Assisting Surgeon    Surgical Findings: 5cm right parotid tumor involving the deep lobe     Specimen: right total parotid gland with tumor     Estimated Blood Loss: Blood Output: 10 mL (12/11/2023  7:48 PM)    IVF: 1400 mL      Liu Issa MD  12/11/2023  8:14 PM

## 2023-12-13 VITALS
BODY MASS INDEX: 25.56 KG/M2 | DIASTOLIC BLOOD PRESSURE: 66 MMHG | TEMPERATURE: 98 F | HEART RATE: 55 BPM | RESPIRATION RATE: 20 BRPM | WEIGHT: 168.63 LBS | OXYGEN SATURATION: 92 % | HEIGHT: 68 IN | SYSTOLIC BLOOD PRESSURE: 164 MMHG

## 2023-12-13 LAB — GLUCOSE BLD-MCNC: 147 MG/DL (ref 70–99)

## 2023-12-13 PROCEDURE — 82962 GLUCOSE BLOOD TEST: CPT

## 2023-12-13 RX ORDER — HYDROCODONE BITARTRATE AND ACETAMINOPHEN 5; 325 MG/1; MG/1
TABLET ORAL
Qty: 35 TABLET | Refills: 0 | Status: SHIPPED | OUTPATIENT
Start: 2023-12-13

## 2023-12-13 RX ORDER — AMOXICILLIN AND CLAVULANATE POTASSIUM 875; 125 MG/1; MG/1
875 TABLET, FILM COATED ORAL EVERY 12 HOURS SCHEDULED
Qty: 14 TABLET | Refills: 0 | Status: SHIPPED | OUTPATIENT
Start: 2023-12-13 | End: 2023-12-20

## 2023-12-13 NOTE — DISCHARGE INSTRUCTIONS
Josefina Williamson MD, [address], [facility]   Phone [phone]  Neck Surgery Instructions    What to expect:    Neck soreness and mild bruising around the incision are typical    Activity / Restrictions:  Keep your head/neck elevated at all times for 1 week after surgery (sleep and rest on at least 2 pillows)  No lifting heavier than 10 pound, bending over at the waist or straining for 2 weeks after surgery  No driving for 1 week after surgery     Wound Care:  Keep the incision area clean and dry for 1 week  Apply antibiotic ointment (Bacitracin or similar, which can be purchased without a prescription at the pharmacy) twice daily to the incision line    Medications : You may receive a prescription for pain medication. Alternatively, you may take Tylenol (acetaminophen) as needed. Do NOT take NSAID products for 2 weeks after your surgery (examples include: ibuprofen, naproxen, Motrin, Advil, aspirin)  If you are prescribed antibiotics, complete the entire prescription, unless otherwise advised by your surgeon    Follow-up:  Inga Brown will need to be seen about 1 week after surgery to check the incision area and review any pathology results. If you did not make a post-op appointment when your surgery was scheduled, please call Dr. Brenton Garza office at then number listed above.

## 2023-12-13 NOTE — PLAN OF CARE
A&Ox4. VSS. RA. Denies chest pain and SOB. GI: Abdomen soft, nondistended. Passing gas. Denies nausea. : Voids up to restroom. Pain controlled with PRN pain medications. Up ad [place]  Drains: OH right neck serosanguinous drainage. Incisions: Right neck, dressing C/D/I  Diet: Tolerating soft diet  Saline locked. Discharge planned for am.   All appropriate safety measures in place. All questions and concerns addressed.

## 2023-12-13 NOTE — PROGRESS NOTES
NURSING DISCHARGE NOTE    Discharged Home via Ambulatory. Accompanied by Family member  Belongings Taken by patient/family. IV removed - patient tolerated well. Discharge instructions provided with printed scripts - patient refused drain and discharge teaching. Family present for discharge refusal. Ambulated with family to exit.

## 2023-12-13 NOTE — PROGRESS NOTES
Patient A&Ox4. Vital signs stable on room air. Pain 5/10; norco given. 3/10 upon reassessment. Abdomen soft, nontender. Patient passing gas. Denies nausea, vomiting, diarrhea. Incision closed with sutures, dressed with telfa and tegaderm, dressing clean, dry, intact. Soft diet being tolerated well. Patient updated on plan of care. Questions and concerns discussed.

## 2023-12-19 NOTE — DISCHARGE SUMMARY
General Medicine Discharge Summary     Patient ID:  Ariana Zuniga  68year old  1/4/1950    Admit date: 12/11/2023    Discharge date and time: 12/13/2023 11:00 AM     Attending Physician: No att. 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 Patient postop had hypertension and pain control which is why he was admitted. Patient at this time is complaining of some cough asking for cough drops on review system otherwise negative, neck pain is bearable and tolerable.      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  -Postop day 1, pain control, postop antibiotics and fluids per ENT  -Currently on Augmentin,'  -okm to dc home per ENT  -OH drain in place, drain management per ENT, discharge when okay with ENT  # Hypertension  -Was uncontrolled, likely driven by pain  -Well-controlled right now, resume home medications and discharge  # Type 2 diabetes  -A1c of 7.0, patient takes metformin at home  -To resume this on discharge  # Stage IIIa chronic kidney disease  -Stable     # COPD with a history of smoking  -Resume home inhalers     Patient is medically stable to be discharged once cleared by ENT      Consults: IP CONSULT TO HOSPITALIST    Operative Procedures: Procedure(s) (LRB):  RIGHT SUPERFICIAL PAROTIDECTOMY WITH DISSECTION AND PRESERVATION OF FACIAL NERVE (Right)     Disposition: home    Patient Instructions:   Discharge Medication List as of 12/13/2023  9:58 AM        START taking these medications    Details   amoxicillin clavulanate 875-125 MG Oral Tab Take 1 tablet (875 mg total) by mouth every 12 (twelve) hours for 7 days. , Print Script, Disp-14 tablet, R-0      HYDROcodone-acetaminophen 5-325 MG Oral Tab 1 or 2 tabs PO q6 hours prn pain, Print Script, Disp-35 tablet, R-0           CONTINUE these medications which have NOT CHANGED    Details   Calcium Magnesium Zinc 333-133-5 MG Oral Tab Take by mouth., Historical      NON FORMULARY Nitric oxide, Historical      Bioflavonoid Products (BIOFLEX OR) Take by mouth., Historical      Ascorbic Acid (VITAMIN C) 100 MG Oral Tab Take 1 tablet (100 mg total) by mouth daily. , Historical      buPROPion  MG Oral Tablet 12 Hr Take 1 tablet (150 mg total) by mouth 2 (two) times daily. , Historical      ALBUTEROL 108 (90 Base) MCG/ACT Inhalation Aero Soln TAKE 2 PUFFS BY MOUTH EVERY 4 TO 6 HOURS AS NEEDED, Normal, Disp-3 each, R-0      AMLODIPINE 5 MG Oral Tab TAKE 1 TABLET BY MOUTH EVERY DAY, Normal, Disp-90 tablet, R-0      ROSUVASTATIN 20 MG Oral Tab TAKE 1 TABLET BY MOUTH EVERY DAY, Normal, Disp-90 tablet, R-0      Sildenafil Citrate (VIAGRA) 100 MG Oral Tab Take 1 tablet (100 mg total) by mouth as needed for Erectile Dysfunction. , Normal, Disp-8 tablet, R-3      metoprolol succinate 25 MG Oral Tablet 24 Hr Take 1 tablet (25 mg total) by mouth in the morning and 1 tablet (25 mg total) before bedtime. , Historical      metFORMIN 500 MG Oral Tab Take 1 tablet (500 mg total) by mouth daily. , Normal, Disp-90 tablet, R-3      ACCU-CHEK FASTCLIX LANCETS Does not apply Misc Check glucose once daily or more as directed, Normal, Disp-102 each, R-3, DAW90 day supply      ACCU-CHEK GUIDE In Vitro Strip Check glucose once daily or more as directed, Normal, Disp-100 strip, R-3, DAW90 day supply           STOP taking these medications       Misc Natural Products (YUMVS BEET ROOT-TART CHERRY OR)        Turmeric 400 MG Oral Cap        Cinnamon 500 MG Oral Cap I reconciled current and discharge medications on day of discharge    Follow-up with pcp, ent      No orders of the defined types were placed in this encounter. Total Time Coordinating Care: Greater than 30 minutes    Patient had opportunity to ask questions and state understand and agree with therapeutic plan as outlined above.      Thank Marek Cary MD

## 2024-10-15 PROCEDURE — 88341 IMHCHEM/IMCYTCHM EA ADD ANTB: CPT | Performed by: PATHOLOGY

## 2024-10-15 PROCEDURE — 88342 IMHCHEM/IMCYTCHM 1ST ANTB: CPT | Performed by: PATHOLOGY

## 2024-10-21 ENCOUNTER — LAB REQUISITION (OUTPATIENT)
Age: 74
End: 2024-10-21
Payer: COMMERCIAL

## 2024-10-21 DIAGNOSIS — D48.9 NEOPLASM OF UNCERTAIN BEHAVIOR: ICD-10-CM

## 2024-10-28 ENCOUNTER — LAB REQUISITION (OUTPATIENT)
Age: 74
End: 2024-10-28
Payer: COMMERCIAL

## 2024-10-28 DIAGNOSIS — D48.9 NEOPLASM OF UNCERTAIN BEHAVIOR: ICD-10-CM

## (undated) DEVICE — SPONGE: SPECIALTY PEANUT XR 100/CS: Brand: MEDICAL ACTION INDUSTRIES

## (undated) DEVICE — HEAD AND NECK CDS-LF: Brand: MEDLINE INDUSTRIES, INC.

## (undated) DEVICE — PREMIUM WET SKIN PREP TRAY: Brand: MEDLINE INDUSTRIES, INC.

## (undated) DEVICE — SUTURE PROL MONO BLU 4/0 18PS2

## (undated) DEVICE — DRAIN SILICONE RND 1/8

## (undated) DEVICE — SLEEVE COMPR M KNEE LEN SGL USE KENDALL SCD

## (undated) DEVICE — STERILE COTTON BALLS LARGE 5/P: Brand: MEDLINE

## (undated) DEVICE — ELECTRODE 8227410 PAIRED 2 CH SET ROHS

## (undated) DEVICE — SUTURE ETHLN SZ 4-0 L18IN NONABSORBABLE BLK .

## (undated) DEVICE — CABLE BPLR L12FT FLYING LD DISP

## (undated) DEVICE — SUTURE ETHLN SZ 2-0 L18IN NONABSORB BLK

## (undated) DEVICE — SUTURE PERMA- SZ 2-0 L30IN NONABSORBABLE

## (undated) DEVICE — SUT SLK 2-0 30IN MULTPK BK

## (undated) DEVICE — UNDYED BRAIDED (POLYGLACTIN 910), SYNTHETIC ABSORBABLE SUTURE: Brand: COATED VICRYL

## (undated) DEVICE — EVACUATOR RELIAVAC 100CC

## (undated) DEVICE — DRAPE UTIL L W18XL24IN PLAS STR ADH REINF

## (undated) DEVICE — E-Z CLEAN, NON-STICK, PTFE COATED, ELECTROSURGICAL NEEDLE ELECTRODE, MODIFIED EXTENDED INSULATION, 2.75 INCH (7 CM): Brand: MEGADYNE

## (undated) DEVICE — SUTURE PERMA  2-0 L18IN NONABSORBABLE BLK

## (undated) DEVICE — PROBE 8225101 5PK STD PRASS FL TIP ROHS

## (undated) DEVICE — PAD N ADH W3XL8IN POLY COT SFT PERF FLM ABSRB

## (undated) DEVICE — SUTURE MCRYL SZ 4-0 L18IN ABSRB UD L19MM PS-2

## (undated) DEVICE — LIGHT HANDLE

## (undated) DEVICE — GAUZE - RF AND X-RAY DETECTABLE USP TYPE VII, 16 PLY: Brand: SITUATE

## (undated) DEVICE — 3M™ TEGADERM™ TRANSPARENT FILM DRESSING FRAME STYLE, 1624W, US-VER, 100/CARTON 4 CARTONS/CASE: Brand: 3M™ TEGADERM™

## (undated) DEVICE — 3M™ TEGADERM™ +PAD FILM DRESSING WITH NON-ADHERENT PAD, 3584, 2-3/8 IN X 4 IN (6 CM X 10 CM), 50/CAR, 4 CAR/CS: Brand: 3M™ TEGADERM™

## (undated) DEVICE — ABSORBABLE HEMOSTAT (OXIDIZED REGENERATED CELLULOSE): Brand: SURGICEL

## (undated) DEVICE — SUT SLK 3-0 LABYRINTH BK

## (undated) DEVICE — 3M(TM) TEGADERM(TM) TRANSPARENT FILM DRESSING FRAME STYLE 1628: Brand: 3M™ TEGADERM™

## (undated) DEVICE — SOLUTION IRRIG 1000ML 0.9% NACL USP BTL

## (undated) DEVICE — STERILE POLYISOPRENE POWDER-FREE SURGICAL GLOVES: Brand: PROTEXIS

## (undated) DEVICE — HARMONIC FOCUS SHEARS 9CM LENGTH + ADAPTIVE TISSUE TECHNOLOGY FOR USE WITH BLUE HAND PIECE ONLY: Brand: HARMONIC FOCUS

## (undated) DEVICE — SUTURE VCRL SZ 3-0 L27IN ABSRB UD L26MM SH

## (undated) DEVICE — 3M™ TEGADERM™ TRANSPARENT FILM DRESSING, 1626W, 4 IN X 4-3/4 IN (10 CM X 12 CM), 50 EACH/CARTON, 4 CARTON/CASE: Brand: 3M™ TEGADERM™

## (undated) DEVICE — SUTURE PLN GUT SZ 5-0 L18IN ABSRB YELLOWISH .

## (undated) NOTE — LETTER
[facility]  [address]  [phone]     Patient: Aquiles Campbell   YOB: 1950   Date of Visit: 11/8/2020     Dear Employer,        November 8, 2020    At [facility], we are taking special precau Persons infected with SARS-CoV-2 who never develop COVID-19 symptoms may discontinue isolation and other precautions 10 days after the date of their first positive RT-PCR test for SARS-CoV-2 RNA.     Persons who are asymptomatic but have been exposed, CDC r

## (undated) NOTE — LETTER
OUTSIDE TESTING RESULT REQUEST     IMPORTANT: FOR YOUR IMMEDIATE ATTENTION  Please FAX all test results listed below to: [phone]     Testing already done on or about: 2023     * * * * If testing is NOT complete, arrange with patient A.S.A.P. * * * *      Patient Name: Gume Hampton  Surgery Date: 2023  Medical Record: [id number]  CSN: [id number]  : 1950 - A: 68 y     Sex: male  Surgeon(s):  MD Ashlyn Nava MD  Procedure: RIGHT SUPERFICIAL PAROTIDECTOMY WITH DISSECTION AND PRESERVATION OF FACIAL NERVE  Anesthesia Type: General     Surgeon: Joseluis Ohara MD     The following Testing and Time Line are REQUIRED PER ANESTHESIA     EKG READ AND SIGNED WITHIN   90 days      Thank Mita Mills,   Sent by: Adonay Deluna

## (undated) NOTE — LETTER
OSR/LEEANNA Notification    Patient Name: Deepali Bee / Sex: 1/4/1950-A: 68 y  male  Medical Records: [id number] CSN: [id number]    Surgeon(s):  Surgeon(s):  MD Brenden Santiago MD   Procedure: RIGHT SUPERFICIAL PAROTIDECTOMY WITH DISSECTION AND PRESERVATION OF FACIAL NERVE    Anesthesia Type: General Surgery Date: 12/11/2023    During the pre-operative screening process using the STOP-Bang questionnaire, the patient listed above was identified as being at high risk for obstructive sleep apnea. If you feel it is appropriate to schedule a sleep study, the [facility] will give priority to patients scheduled for procedures to minimize surgical delays. If a sleep study is completed prior to surgery, please fax the results/plan of care to Bibiana Sheth  ([phone]) as this information will be utilized in clinical decision-making regarding the patient's upcoming surgery. Thank you for your assistance in helping us provide a safe, seamless and personal experience for your patient.     MD Saúl Tucker, Department of Anesthesia